# Patient Record
Sex: FEMALE | Race: BLACK OR AFRICAN AMERICAN | URBAN - METROPOLITAN AREA
[De-identification: names, ages, dates, MRNs, and addresses within clinical notes are randomized per-mention and may not be internally consistent; named-entity substitution may affect disease eponyms.]

---

## 2023-03-23 ENCOUNTER — WEB ENCOUNTER (OUTPATIENT)
Dept: URBAN - METROPOLITAN AREA CLINIC 25 | Facility: CLINIC | Age: 78
End: 2023-03-23

## 2023-03-23 ENCOUNTER — OFFICE VISIT (OUTPATIENT)
Dept: URBAN - METROPOLITAN AREA CLINIC 25 | Facility: CLINIC | Age: 78
End: 2023-03-23
Payer: COMMERCIAL

## 2023-03-23 VITALS
HEART RATE: 68 BPM | TEMPERATURE: 97.5 F | BODY MASS INDEX: 25.69 KG/M2 | SYSTOLIC BLOOD PRESSURE: 146 MMHG | HEIGHT: 62 IN | WEIGHT: 139.6 LBS | DIASTOLIC BLOOD PRESSURE: 87 MMHG

## 2023-03-23 DIAGNOSIS — R68.81 EARLY SATIETY: ICD-10-CM

## 2023-03-23 DIAGNOSIS — R63.0 LACK OF APPETITE: ICD-10-CM

## 2023-03-23 PROCEDURE — 99203 OFFICE O/P NEW LOW 30 MIN: CPT

## 2023-03-23 NOTE — HPI-TODAY'S VISIT:
Ms. Hernández is a 77y F, w/ a pmhx of breast CA s/p lumpectomy, she presents today complaining of poor appetite and early satiety, she was referred to the office by Dr. Plata  (+) abnormal weight loss  - PCP has ordered CBC/CMP/TSH/HCV  - Pt reports 6lbs weight loss from 144 to 139, ~ 3% weight change in 3-6 months  - She reports normal appetite, denies N/V, no issues with mastication (reports two broken teeth), denies dysphagia, or odynophgia  - Lumpectomy performed on Nov 21' w/ no complications  (-) abdominal pain  - Denies BRBPR, melena, bloating/gas discomfort, change in bowel habits, diarrhea, cosntipation, BM every other day w/ no straining  - States that when she sees food she feels full already    Has started taking ensure supplements, elderberry, and vitamin supplements  Last colonoscopy: Greater than 10 years, history of one instance of polyps Denies famhx of colon CA/Polyps

## 2023-04-06 PROBLEM — 79890006: Status: ACTIVE | Noted: 2023-04-06

## 2023-06-23 ENCOUNTER — OFFICE VISIT (OUTPATIENT)
Dept: URBAN - METROPOLITAN AREA CLINIC 25 | Facility: CLINIC | Age: 78
End: 2023-06-23
Payer: COMMERCIAL

## 2023-06-23 VITALS
HEIGHT: 62 IN | HEART RATE: 61 BPM | DIASTOLIC BLOOD PRESSURE: 82 MMHG | WEIGHT: 137 LBS | BODY MASS INDEX: 25.21 KG/M2 | SYSTOLIC BLOOD PRESSURE: 137 MMHG | TEMPERATURE: 97.5 F

## 2023-06-23 DIAGNOSIS — R63.0 LACK OF APPETITE: ICD-10-CM

## 2023-06-23 DIAGNOSIS — R68.81 EARLY SATIETY: ICD-10-CM

## 2023-06-23 DIAGNOSIS — Z86.010 HISTORY OF COLON POLYPS: ICD-10-CM

## 2023-06-23 PROBLEM — 428283002: Status: ACTIVE | Noted: 2023-06-23

## 2023-06-23 PROCEDURE — 99213 OFFICE O/P EST LOW 20 MIN: CPT

## 2023-06-23 RX ORDER — DORZOLAMIDE HYDROCHLORIDE AND TIMOLOL MALEATE 20; 5 MG/ML; MG/ML
1 DROP INTO AFFECTED EYE SOLUTION/ DROPS OPHTHALMIC TWICE A DAY
Status: ACTIVE | COMMUNITY

## 2023-06-23 RX ORDER — LETROZOLE 2.5 MG/1
1 TABLET TABLET, FILM COATED ORAL ONCE A DAY
Status: ACTIVE | COMMUNITY

## 2023-06-23 RX ORDER — BIMATOPROST 0.1 MG/ML
1 DROP INTO AFFECTED EYE IN THE EVENING SOLUTION/ DROPS OPHTHALMIC ONCE A DAY
Status: ACTIVE | COMMUNITY

## 2023-06-23 NOTE — HPI-TODAY'S VISIT:
06/23 OV  Patient presents for f/u for concerns of weight loss and lack of appetite (+) Weight loss  - Patient reports that her appetite has been stable, however, she has been under increased stress lately d/t deaths in her family  - Patient denies any early satiety, she notes increased fiber in her diet  - Taking 2-3 ensures intermittently, denies fatigue or weakness, patient has maintained a very busy lifestyle - Patient reports she is able to eat 3 meals normally again  - 2lb weight loss since last visit, today in office patient weighs 137 and has hovered around 135-145 over the last few months  - Patient continuing letrozole prescription, no adverse effects reported  - Good bowel habitus, once a day or every other day w/o any hard stools or straining  - Patient saw a nutritionist recently which has helped her to improve her dietary intake  - Denies BRBPR, melena, bloating/gas discomfort, change in bowel habits, diarrhea, cosntipation, N/V, SOB/CP, lethargy

## 2023-07-18 ENCOUNTER — OFFICE VISIT (OUTPATIENT)
Dept: URBAN - METROPOLITAN AREA SURGERY CENTER 20 | Facility: SURGERY CENTER | Age: 78
End: 2023-07-18
Payer: COMMERCIAL

## 2023-07-18 ENCOUNTER — TELEPHONE ENCOUNTER (OUTPATIENT)
Dept: URBAN - METROPOLITAN AREA CLINIC 84 | Facility: CLINIC | Age: 78
End: 2023-07-18

## 2023-07-18 ENCOUNTER — CLAIMS CREATED FROM THE CLAIM WINDOW (OUTPATIENT)
Dept: URBAN - METROPOLITAN AREA CLINIC 4 | Facility: CLINIC | Age: 78
End: 2023-07-18
Payer: COMMERCIAL

## 2023-07-18 DIAGNOSIS — D12.3 ADENOMA OF TRANSVERSE COLON: ICD-10-CM

## 2023-07-18 DIAGNOSIS — D12.3 BENIGN NEOPLASM OF TRANSVERSE COLON: ICD-10-CM

## 2023-07-18 DIAGNOSIS — Z86.010 ADENOMAS PERSONAL HISTORY OF COLONIC POLYPS: ICD-10-CM

## 2023-07-18 PROCEDURE — G8907 PT DOC NO EVENTS ON DISCHARG: HCPCS | Performed by: INTERNAL MEDICINE

## 2023-07-18 PROCEDURE — 45380 COLONOSCOPY AND BIOPSY: CPT | Performed by: INTERNAL MEDICINE

## 2023-07-18 PROCEDURE — 88305 TISSUE EXAM BY PATHOLOGIST: CPT | Performed by: PATHOLOGY

## 2023-07-18 RX ORDER — LETROZOLE 2.5 MG/1
1 TABLET TABLET, FILM COATED ORAL ONCE A DAY
Status: ACTIVE | COMMUNITY

## 2023-07-18 RX ORDER — DORZOLAMIDE HYDROCHLORIDE AND TIMOLOL MALEATE 20; 5 MG/ML; MG/ML
1 DROP INTO AFFECTED EYE SOLUTION/ DROPS OPHTHALMIC TWICE A DAY
Status: ACTIVE | COMMUNITY

## 2023-07-18 RX ORDER — BIMATOPROST 0.1 MG/ML
1 DROP INTO AFFECTED EYE IN THE EVENING SOLUTION/ DROPS OPHTHALMIC ONCE A DAY
Status: ACTIVE | COMMUNITY

## 2023-08-01 ENCOUNTER — OFFICE VISIT (OUTPATIENT)
Dept: URBAN - METROPOLITAN AREA SURGERY CENTER 20 | Facility: SURGERY CENTER | Age: 78
End: 2023-08-01

## 2023-08-23 ENCOUNTER — OFFICE VISIT (OUTPATIENT)
Dept: URBAN - METROPOLITAN AREA SURGERY CENTER 20 | Facility: SURGERY CENTER | Age: 78
End: 2023-08-23

## 2023-08-23 ENCOUNTER — CLAIMS CREATED FROM THE CLAIM WINDOW (OUTPATIENT)
Dept: URBAN - METROPOLITAN AREA CLINIC 4 | Facility: CLINIC | Age: 78
End: 2023-08-23
Payer: COMMERCIAL

## 2023-08-23 ENCOUNTER — TELEPHONE ENCOUNTER (OUTPATIENT)
Dept: URBAN - METROPOLITAN AREA CLINIC 25 | Facility: CLINIC | Age: 78
End: 2023-08-23

## 2023-08-23 ENCOUNTER — CLAIMS CREATED FROM THE CLAIM WINDOW (OUTPATIENT)
Dept: URBAN - METROPOLITAN AREA SURGERY CENTER 20 | Facility: SURGERY CENTER | Age: 78
End: 2023-08-23
Payer: COMMERCIAL

## 2023-08-23 DIAGNOSIS — K31.89 OTHER DISEASES OF STOMACH AND DUODENUM, GASTRIC POLYPS: ICD-10-CM

## 2023-08-23 DIAGNOSIS — R63.4 ABNORMAL INTENTIONAL WEIGHT LOSS: ICD-10-CM

## 2023-08-23 DIAGNOSIS — R10.13 ABDOMINAL DISCOMFORT, EPIGASTRIC: ICD-10-CM

## 2023-08-23 DIAGNOSIS — K31.89 OTHER DISEASES OF STOMACH AND DUODENUM: ICD-10-CM

## 2023-08-23 PROCEDURE — G8907 PT DOC NO EVENTS ON DISCHARG: HCPCS | Performed by: INTERNAL MEDICINE

## 2023-08-23 PROCEDURE — 43239 EGD BIOPSY SINGLE/MULTIPLE: CPT | Performed by: INTERNAL MEDICINE

## 2023-08-23 PROCEDURE — 88305 TISSUE EXAM BY PATHOLOGIST: CPT | Performed by: PATHOLOGY

## 2023-08-23 RX ORDER — DORZOLAMIDE HYDROCHLORIDE AND TIMOLOL MALEATE 20; 5 MG/ML; MG/ML
1 DROP INTO AFFECTED EYE SOLUTION/ DROPS OPHTHALMIC TWICE A DAY
Status: ACTIVE | COMMUNITY

## 2023-08-23 RX ORDER — BIMATOPROST 0.1 MG/ML
1 DROP INTO AFFECTED EYE IN THE EVENING SOLUTION/ DROPS OPHTHALMIC ONCE A DAY
Status: ACTIVE | COMMUNITY

## 2023-08-23 RX ORDER — LETROZOLE 2.5 MG/1
1 TABLET TABLET, FILM COATED ORAL ONCE A DAY
Status: ACTIVE | COMMUNITY

## 2023-09-01 ENCOUNTER — TELEPHONE ENCOUNTER (OUTPATIENT)
Dept: URBAN - METROPOLITAN AREA CLINIC 63 | Facility: CLINIC | Age: 78
End: 2023-09-01

## 2023-09-05 ENCOUNTER — LAB OUTSIDE AN ENCOUNTER (OUTPATIENT)
Dept: URBAN - METROPOLITAN AREA CLINIC 84 | Facility: CLINIC | Age: 78
End: 2023-09-05

## 2023-09-05 LAB
CREATININE POC: 0.9
PERFORMING LAB: (no result)

## 2023-09-25 ENCOUNTER — TELEPHONE ENCOUNTER (OUTPATIENT)
Dept: URBAN - METROPOLITAN AREA CLINIC 25 | Facility: CLINIC | Age: 78
End: 2023-09-25

## 2023-11-01 ENCOUNTER — OFFICE VISIT (OUTPATIENT)
Dept: URBAN - METROPOLITAN AREA CLINIC 25 | Facility: CLINIC | Age: 78
End: 2023-11-01

## 2023-11-15 ENCOUNTER — OFFICE VISIT (OUTPATIENT)
Dept: URBAN - METROPOLITAN AREA CLINIC 25 | Facility: CLINIC | Age: 78
End: 2023-11-15

## 2023-11-15 RX ORDER — DORZOLAMIDE HYDROCHLORIDE AND TIMOLOL MALEATE 20; 5 MG/ML; MG/ML
1 DROP INTO AFFECTED EYE SOLUTION/ DROPS OPHTHALMIC TWICE A DAY
Status: ACTIVE | COMMUNITY

## 2023-11-15 RX ORDER — BIMATOPROST 0.1 MG/ML
1 DROP INTO AFFECTED EYE IN THE EVENING SOLUTION/ DROPS OPHTHALMIC ONCE A DAY
Status: ACTIVE | COMMUNITY

## 2023-11-15 RX ORDER — LETROZOLE 2.5 MG/1
1 TABLET TABLET, FILM COATED ORAL ONCE A DAY
Status: ACTIVE | COMMUNITY

## 2023-11-22 ENCOUNTER — OFFICE VISIT (OUTPATIENT)
Dept: URBAN - METROPOLITAN AREA CLINIC 25 | Facility: CLINIC | Age: 78
End: 2023-11-22
Payer: COMMERCIAL

## 2023-11-22 VITALS
SYSTOLIC BLOOD PRESSURE: 138 MMHG | HEART RATE: 69 BPM | HEIGHT: 62 IN | WEIGHT: 134 LBS | BODY MASS INDEX: 24.66 KG/M2 | DIASTOLIC BLOOD PRESSURE: 82 MMHG | TEMPERATURE: 97.3 F

## 2023-11-22 DIAGNOSIS — R68.81 EARLY SATIETY: ICD-10-CM

## 2023-11-22 DIAGNOSIS — R63.0 LACK OF APPETITE: ICD-10-CM

## 2023-11-22 DIAGNOSIS — Z86.010 HISTORY OF COLON POLYPS: ICD-10-CM

## 2023-11-22 DIAGNOSIS — K29.00 ACUTE GASTRITIS WITHOUT HEMORRHAGE, UNSPECIFIED GASTRITIS TYPE: ICD-10-CM

## 2023-11-22 DIAGNOSIS — K44.9 HERNIA, HIATAL: ICD-10-CM

## 2023-11-22 PROCEDURE — 99214 OFFICE O/P EST MOD 30 MIN: CPT

## 2023-11-22 RX ORDER — LETROZOLE 2.5 MG/1
1 TABLET TABLET, FILM COATED ORAL ONCE A DAY
Status: ACTIVE | COMMUNITY

## 2023-11-22 RX ORDER — OMEPRAZOLE 20 MG/1
1 CAPSULE 30 MINUTES BEFORE MORNING MEAL CAPSULE, DELAYED RELEASE ORAL ONCE A DAY
Qty: 90 | Refills: 1 | OUTPATIENT
Start: 2023-11-22

## 2023-11-22 RX ORDER — BIMATOPROST 0.1 MG/ML
1 DROP INTO AFFECTED EYE IN THE EVENING SOLUTION/ DROPS OPHTHALMIC ONCE A DAY
Status: ACTIVE | COMMUNITY

## 2023-11-22 RX ORDER — DORZOLAMIDE HYDROCHLORIDE AND TIMOLOL MALEATE 20; 5 MG/ML; MG/ML
1 DROP INTO AFFECTED EYE SOLUTION/ DROPS OPHTHALMIC TWICE A DAY
Status: ACTIVE | COMMUNITY

## 2023-11-22 NOTE — HPI-TODAY'S VISIT:
11/23 OV  Patient presents for f/u for evaluation of unintentional weight loss and early satiety, patient naidagth in office today is 134, down 5lbs from the initial evaluation in March, total of 10lb weight loss over the course of a full year 2' to decreased appetite, colonoscopy revealed a small tubular adenoma, EGD significant for mild gastritis w/o histological abnormalities, and CT only remarkable for small kidney stones and hiatal hernia.  - Patient notes she has been feeling well, but she is still having trouble with appetite, she has been drinking ensure which has caused her to maintain early satiety, patient still eating small portions  - Denies any abdominal pain/discomfort, N/V, heartburn, dysphagia, odynophagia, urinary abnormalities/pain/uremia, BRBPR, melena, SOB/CP    Colon 2023  - One 1 mm polyp in the transverse colon, removed with a cold biopsy forceps. Resected and retrieved - Tubular adenoma  .- Non-bleeding internal hemorrhoids.  EGD 2023  - Normal examined duodenum.- Gastritis. Biopsied.- Small hiatal hernia.- Normal upper third of esophagus, middle third of esophagus and lower third of esophagus.  CT a/p   IMPRESSION:        Probable small hiatal hernia. No large herniation of stomach above        diaphragm is identified. Probable nonobstructing stone in the upper pole right kidney 3 to 4 mm        in size. No hydronephrosis.

## 2023-11-22 NOTE — HPI-OTHER HISTORIES
06/23 OV Patient presents for f/u for concerns of weight loss and lack of appetite (+) Weight loss - Patient reports that her appetite has been stable, however, she has been under increased stress lately d/t deaths in her family - Patient denies any early satiety, she notes increased fiber in her diet - Taking 2-3 ensures intermittently, denies fatigue or weakness, patient has maintained a very busy lifestyle - Patient reports she is able to eat 3 meals normally again - 2lb weight loss since last visit, today in office patient weighs 137 and has hovered around 135-145 over the last few months - Patient continuing letrozole prescription, no adverse effects reported - Good bowel habitus, once a day or every other day w/o any hard stools or straining - Patient saw a nutritionist recently which has helped her to improve her dietary intake - Denies BRBPR, melena, bloating/gas discomfort, change in bowel habits, diarrhea, cosntipation, N/V, SOB/CP, lethargy    Ms. Hernández is a 77y F, w/ a pmhx of breast CA s/p lumpectomy, she presents today complaining of poor appetite and early satiety, she was referred to the office by Dr. Plata  (+) abnormal weight loss  - PCP has ordered CBC/CMP/TSH/HCV  - Pt reports 6lbs weight loss from 144 to 139, ~ 3% weight change in 3-6 months  - She reports normal appetite, denies N/V, no issues with mastication (reports two broken teeth), denies dysphagia, or odynophgia  - Lumpectomy performed on Nov 21' w/ no complications  (-) abdominal pain  - Denies BRBPR, melena, bloating/gas discomfort, change in bowel habits, diarrhea, cosntipation, BM every other day w/ no straining  - States that when she sees food she feels full already    Has started taking ensure supplements, elderberry, and vitamin supplements  Last colonoscopy: Greater than 10 years, history of one instance of polyps Denies famhx of colon CA/Polyps

## 2024-02-21 ENCOUNTER — OV EP (OUTPATIENT)
Dept: URBAN - METROPOLITAN AREA CLINIC 25 | Facility: CLINIC | Age: 79
End: 2024-02-21
Payer: COMMERCIAL

## 2024-02-21 VITALS
WEIGHT: 139 LBS | DIASTOLIC BLOOD PRESSURE: 78 MMHG | TEMPERATURE: 97.4 F | HEIGHT: 62 IN | BODY MASS INDEX: 25.58 KG/M2 | HEART RATE: 65 BPM | SYSTOLIC BLOOD PRESSURE: 128 MMHG

## 2024-02-21 DIAGNOSIS — R68.81 EARLY SATIETY: ICD-10-CM

## 2024-02-21 DIAGNOSIS — K44.9 HERNIA, HIATAL: ICD-10-CM

## 2024-02-21 DIAGNOSIS — Z86.010 HISTORY OF COLON POLYPS: ICD-10-CM

## 2024-02-21 DIAGNOSIS — R63.0 LACK OF APPETITE: ICD-10-CM

## 2024-02-21 DIAGNOSIS — K29.00 ACUTE GASTRITIS WITHOUT HEMORRHAGE, UNSPECIFIED GASTRITIS TYPE: ICD-10-CM

## 2024-02-21 PROCEDURE — 99213 OFFICE O/P EST LOW 20 MIN: CPT

## 2024-02-21 RX ORDER — LETROZOLE 2.5 MG/1
1 TABLET TABLET, FILM COATED ORAL ONCE A DAY
Status: ACTIVE | COMMUNITY

## 2024-02-21 RX ORDER — BIMATOPROST 0.1 MG/ML
1 DROP INTO AFFECTED EYE IN THE EVENING SOLUTION/ DROPS OPHTHALMIC ONCE A DAY
Status: ACTIVE | COMMUNITY

## 2024-02-21 RX ORDER — OMEPRAZOLE 20 MG/1
1 CAPSULE 30 MINUTES BEFORE MORNING MEAL CAPSULE, DELAYED RELEASE ORAL ONCE A DAY
Qty: 90 | Refills: 1 | OUTPATIENT

## 2024-02-21 RX ORDER — OMEPRAZOLE 20 MG/1
1 CAPSULE 30 MINUTES BEFORE MORNING MEAL CAPSULE, DELAYED RELEASE ORAL ONCE A DAY
Qty: 90 | Refills: 1 | Status: ACTIVE | COMMUNITY
Start: 2023-11-22

## 2024-02-21 RX ORDER — DORZOLAMIDE HYDROCHLORIDE AND TIMOLOL MALEATE 20; 5 MG/ML; MG/ML
1 DROP INTO AFFECTED EYE SOLUTION/ DROPS OPHTHALMIC TWICE A DAY
Status: ACTIVE | COMMUNITY

## 2024-02-21 NOTE — HPI-TODAY'S VISIT:
02/24 OV Currently taking PPI 20mg QD, f/u for decreased appetite, weight has increased from 134 back to her baseline of 139, patient notes her appetite has returned, heartburn is well managed on PPI 20mg currently no issues. 3 daily meals w/o any issues. Deneis any abdominal pain, BRBRP, melena, unintentional weight loss, N/V, changes in bowels

## 2024-02-21 NOTE — HPI-OTHER HISTORIES
11/23 OV  Patient presents for f/u for evaluation of unintentional weight loss and early satiety, patient weigth in office today is 134, down 5lbs from the initial evaluation in March, total of 10lb weight loss over the course of a full year 2' to decreased appetite, colonoscopy revealed a small tubular adenoma, EGD significant for mild gastritis w/o histological abnormalities, and CT only remarkable for small kidney stones and hiatal hernia.  - Patient notes she has been feeling well, but she is still having trouble with appetite, she has been drinking ensure which has caused her to maintain early satiety, patient still eating small portions  - Denies any abdominal pain/discomfort, N/V, heartburn, dysphagia, odynophagia, urinary abnormalities/pain/uremia, BRBPR, melena, SOB/CP    Colon 2023  - One 1 mm polyp in the transverse colon, removed with a cold biopsy forceps. Resected and retrieved - Tubular adenoma  .- Non-bleeding internal hemorrhoids.  EGD 2023  - Normal examined duodenum.- Gastritis. Biopsied.- Small hiatal hernia.- Normal upper third of esophagus, middle third of esophagus and lower third of esophagus.  CT a/p   IMPRESSION: Probable small hiatal hernia. No large herniation of stomach above diaphragm is identified. Probable nonobstructing stone in the upper pole right kidney 3 to 4 mm in size. No hydronephrosis.  06/23 OV Patient presents for f/u for concerns of weight loss and lack of appetite (+) Weight loss - Patient reports that her appetite has been stable, however, she has been under increased stress lately d/t deaths in her family - Patient denies any early satiety, she notes increased fiber in her diet - Taking 2-3 ensures intermittently, denies fatigue or weakness, patient has maintained a very busy lifestyle - Patient reports she is able to eat 3 meals normally again - 2lb weight loss since last visit, today in office patient weighs 137 and has hovered around 135-145 over the last few months - Patient continuing letrozole prescription, no adverse effects reported - Good bowel habitus, once a day or every other day w/o any hard stools or straining - Patient saw a nutritionist recently which has helped her to improve her dietary intake - Denies BRBPR, melena, bloating/gas discomfort, change in bowel habits, diarrhea, cosntipation, N/V, SOB/CP, lethargy    Ms. Hernández is a 77y F, w/ a pmhx of breast CA s/p lumpectomy, she presents today complaining of poor appetite and early satiety, she was referred to the office by Dr. Plata  (+) abnormal weight loss  - PCP has ordered CBC/CMP/TSH/HCV  - Pt reports 6lbs weight loss from 144 to 139, ~ 3% weight change in 3-6 months  - She reports normal appetite, denies N/V, no issues with mastication (reports two broken teeth), denies dysphagia, or odynophgia  - Lumpectomy performed on Nov 21' w/ no complications  (-) abdominal pain  - Denies BRBPR, melena, bloating/gas discomfort, change in bowel habits, diarrhea, cosntipation, BM every other day w/ no straining  - States that when she sees food she feels full already    Has started taking ensure supplements, elderberry, and vitamin supplements  Last colonoscopy: Greater than 10 years, history of one instance of polyps Denies famhx of colon CA/Polyps

## 2024-05-22 NOTE — PHYSICAL EXAM EYES:
"LOV 5/20/24 Dr. Jaiyeola (hemorrhoids/constipation), currently scheduled for combo 8/16/24 with follow up 10/15/24.    Office recommendation was for Golytely bowel prep and start MiraLax once a day increase as needed in order to have soft, formed bowel movement per day, anusol suppositories ordered for hemorrhoids. May require Linzess or Amitiza in future. Patient did not receive Golytely and has just been taking the MiraLax and suppositories.    I spoke with Stacie at [facility] and was informed it requires prior auth. I researched formulary and Gavilyte and Trilyte are listed as supplemental formulary and should be covered. Please review and order alternate. I did update patient.    Answer Assessment - Initial Assessment Questions  1. STOOL PATTERN OR FREQUENCY: \"How often do you pass bowel movements (BMs)?\"  (Normal range: tid to q 3 days)  \"When was the last BM passed?\"        I spoke with patient, she has only been using the MiraLax and suppositories at present.    Protocols used: Constipation-ADULT-[place]    " Conjuntivae and eyelids appear normal,  Sclerae : White without injection